# Patient Record
Sex: MALE | Race: WHITE | NOT HISPANIC OR LATINO | Employment: FULL TIME | URBAN - METROPOLITAN AREA
[De-identification: names, ages, dates, MRNs, and addresses within clinical notes are randomized per-mention and may not be internally consistent; named-entity substitution may affect disease eponyms.]

---

## 2018-10-22 ENCOUNTER — OFFICE VISIT CONVERTED (OUTPATIENT)
Dept: SURGERY | Facility: CLINIC | Age: 38
End: 2018-10-22
Attending: SURGERY

## 2018-11-27 ENCOUNTER — OFFICE VISIT CONVERTED (OUTPATIENT)
Dept: SURGERY | Facility: CLINIC | Age: 38
End: 2018-11-27
Attending: SURGERY

## 2018-12-11 ENCOUNTER — OFFICE VISIT CONVERTED (OUTPATIENT)
Dept: SURGERY | Facility: CLINIC | Age: 38
End: 2018-12-11
Attending: SURGERY

## 2021-05-16 VITALS — BODY MASS INDEX: 37.1 KG/M2 | HEIGHT: 71 IN | WEIGHT: 265 LBS | RESPIRATION RATE: 16 BRPM

## 2021-05-16 VITALS — RESPIRATION RATE: 16 BRPM | WEIGHT: 264 LBS | HEIGHT: 71 IN | BODY MASS INDEX: 36.96 KG/M2

## 2021-05-16 VITALS — HEIGHT: 71 IN | BODY MASS INDEX: 36.96 KG/M2 | WEIGHT: 264 LBS | RESPIRATION RATE: 14 BRPM

## 2023-05-02 ENCOUNTER — OFFICE VISIT (OUTPATIENT)
Dept: ORTHOPEDIC SURGERY | Facility: CLINIC | Age: 43
End: 2023-05-02
Payer: COMMERCIAL

## 2023-05-02 VITALS — HEIGHT: 71 IN | WEIGHT: 285 LBS | BODY MASS INDEX: 39.9 KG/M2

## 2023-05-02 DIAGNOSIS — M25.561 RIGHT KNEE PAIN, UNSPECIFIED CHRONICITY: Primary | ICD-10-CM

## 2023-05-02 DIAGNOSIS — S83.241A ACUTE MEDIAL MENISCUS TEAR OF RIGHT KNEE, INITIAL ENCOUNTER: ICD-10-CM

## 2023-05-02 NOTE — PROGRESS NOTES
"Chief Complaint  Initial Evaluation of the Right Knee     Subjective      Sung Vasquez presents to [facility] for initial evaluation of the right knee.  He has had knee injuries off and on for years. His pain started beginning of January.  Pain with side to side and twisting is painful. He has difficulty with transition from sit to stand.  Walking doesn't hurt but uneven ground and stairs are difficulty.  He has braced and used anti inflammatories in the past.      No Known Allergies     Social History     Socioeconomic History   • Marital status:    Tobacco Use   • Smoking status: Never     Passive exposure: Never   • Smokeless tobacco: Never   Vaping Use   • Vaping Use: Never used   Substance and Sexual Activity   • Alcohol use: Yes     Comment: rarely        Review of Systems     Objective   Vital Signs:   Ht 180.3 cm (71\")   Wt 129 kg (285 lb)   BMI 39.75 kg/m²       Physical Exam  Constitutional:       Appearance: Normal appearance. Patient is well-developed and normal weight.   HENT:      Head: Normocephalic.      Right Ear: Hearing and external ear normal.      Left Ear: Hearing and external ear normal.      Nose: Nose normal.   Eyes:      Conjunctiva/sclera: Conjunctivae normal.   Cardiovascular:      Rate and Rhythm: Normal rate.   Pulmonary:      Effort: Pulmonary effort is normal.      Breath sounds: No wheezing or rales.   Abdominal:      Palpations: Abdomen is soft.      Tenderness: There is no abdominal tenderness.   Musculoskeletal:      Cervical back: Normal range of motion.   Skin:     Findings: No rash.   Neurological:      Mental Status: Patient is alert and oriented to person, place, and time.   Psychiatric:         Mood and Affect: Mood and affect normal.         Judgment: Judgment normal.       Ortho Exam      RIGHT KNEE Flexion 120. Extension 0. Stable to varus/valgus stress. Stable to anterior/posterior drawer. Neurovascularly intact. Positive " Filemon. Negative Lachman. Positive EHL, FHL, HS and TA. Sensation intact to light touch all 5 nerves of the foot. Ambulates with Non-antalgic gait. Patella is well tracking. Calf supple, non-tender. Positive tenderness to the medial joint line. Positive tenderness to the lateral joint line. Positive Crepitus. Good strength to hamstrings, quadriceps, dorsiflexors, and plantar flexors.  Knee Extensor Mechanism intact        Procedures      Imaging Results (Most Recent)     Procedure Component Value Units Date/Time    XR Knee 3 View Right [[id number]] Resulted: 05/02/23 1525     Updated: 05/02/23 1528           Result Review :     X-Ray Report:  Right knee X-Ray  Indication: Evaluation of the right knee  AP/Lateral and [place] view(s)  Findings: Minimal arthritis.  No fracture or dislocation.    Prior studies available for comparison: Yes            Assessment and Plan     Diagnoses and all orders for this visit:    1. Right knee pain, unspecified chronicity (Primary)  -     XR Knee 3 View Right    2. Acute medial meniscus tear of right knee, initial encounter      Discussed the treatment plan with the patient. I reviewed the X-rays that were obtained today with the patient. MRI to assess possible tears.     Prescribed anti inflammatory.     Call or return if worsening symptoms.    Follow Up     After MRI.       Patient was given instructions and counseling regarding his condition or for health maintenance advice. Please see specific information pulled into the AVS if appropriate.     Scribed for Philip Dee MD by Linnea Torres MA.  05/02/23   15:48 EDT    I have personally performed the services described in this document as scribed by the above individual and it is both accurate and complete. Philip Dee MD 05/04/23

## 2023-05-04 RX ORDER — DICLOFENAC SODIUM 75 MG/1
75 TABLET, DELAYED RELEASE ORAL 2 TIMES DAILY
Qty: 60 TABLET | Refills: 0 | Status: SHIPPED | OUTPATIENT
Start: 2023-05-04

## 2023-05-18 ENCOUNTER — HOSPITAL ENCOUNTER (OUTPATIENT)
Dept: MRI IMAGING | Facility: HOSPITAL | Age: 43
Discharge: HOME | End: 2023-05-18
Payer: COMMERCIAL

## 2023-05-18 DIAGNOSIS — S83.241A ACUTE MEDIAL MENISCUS TEAR OF RIGHT KNEE, INITIAL ENCOUNTER: ICD-10-CM

## 2023-05-18 DIAGNOSIS — M25.561 RIGHT KNEE PAIN, UNSPECIFIED CHRONICITY: ICD-10-CM

## 2023-05-18 PROCEDURE — 73721 MRI JNT OF LWR EXTRE W/O DYE: CPT

## 2023-05-30 ENCOUNTER — OFFICE VISIT (OUTPATIENT)
Dept: ORTHOPEDIC SURGERY | Facility: CLINIC | Age: 43
End: 2023-05-30

## 2023-05-30 VITALS — WEIGHT: 285 LBS | OXYGEN SATURATION: 97 % | BODY MASS INDEX: 39.9 KG/M2 | HEIGHT: 71 IN

## 2023-05-30 DIAGNOSIS — S83.241D ACUTE MEDIAL MENISCUS TEAR OF RIGHT KNEE, SUBSEQUENT ENCOUNTER: Primary | ICD-10-CM

## 2023-05-30 NOTE — PROGRESS NOTES
"Chief Complaint  Pain and Follow-up of the Right Knee     Rey Vasquez presents to [facility] for follow up of the right knee.  He is here today for MRI results. He is trying a different brace, but is still having pain on the medial aspect of his knee.  Side to side movements are still painful. In 2015 had a meniscus repair.  Walking doesn't hurt but uneven ground and stairs are difficulty.     No Known Allergies     Social History     Socioeconomic History   • Marital status:    Tobacco Use   • Smoking status: Never     Passive exposure: Never   • Smokeless tobacco: Never   Vaping Use   • Vaping Use: Never used   Substance and Sexual Activity   • Alcohol use: Yes     Comment: rarely        Review of Systems     Objective   Vital Signs:   Ht 180.3 cm (71\")   Wt 129 kg (285 lb)   SpO2 97%   BMI 39.75 kg/m²       Physical Exam  Constitutional:       Appearance: Normal appearance. Patient is well-developed and normal weight.   HENT:      Head: Normocephalic.      Right Ear: Hearing and external ear normal.      Left Ear: Hearing and external ear normal.      Nose: Nose normal.   Eyes:      Conjunctiva/sclera: Conjunctivae normal.   Cardiovascular:      Rate and Rhythm: Normal rate.   Pulmonary:      Effort: Pulmonary effort is normal.      Breath sounds: No wheezing or rales.   Abdominal:      Palpations: Abdomen is soft.      Tenderness: There is no abdominal tenderness.   Musculoskeletal:      Cervical back: Normal range of motion.   Skin:     Findings: No rash.   Neurological:      Mental Status: Patient is alert and oriented to person, place, and time.   Psychiatric:         Mood and Affect: Mood and affect normal.         Judgment: Judgment normal.       Ortho Exam      RIGHT KNEE Flexion 120. Extension 0. Stable to varus/valgus stress. Stable to anterior/posterior drawer. Neurovascularly intact. Positive Filemon. Negative Lachman. Positive EHL, FHL, " HS and TA. Sensation intact to light touch all 5 nerves of the foot. Ambulates with Non-antalgic gait. Patella is well tracking. Calf supple, non-tender. Positive tenderness to the medial joint line. Positive tenderness to the lateral joint line. Positive Crepitus. Good strength to hamstrings, quadriceps, dorsiflexors, and plantar flexors.  Knee Extensor Mechanism intact      Procedures      Imaging Results (Most Recent)     None           Result Review :         XR Knee 3 View Right    Result Date: 5/4/2023  Narrative: X-Ray Report: Right knee X-Ray Indication: Evaluation of the right knee AP/Lateral and [place] view(s) Findings: Minimal arthritis.  No fracture or dislocation.  Prior studies available for comparison: Yes    MRI Knee Right Without Contrast    Result Date: 5/19/2023  Narrative: RIGHT KNEE MRI  HISTORY: Right knee pain; suspected meniscus tear. No trauma.  TECHNIQUE: Right knee MRI was performed in standard fashion.  FINDINGS: Marrow signal is normal. There is a complex medial meniscus tear involving the body and the posterior horn. Extensive abnormal signal extends along the inferior articular surface of the posterior horn and into the red-red zone. There also appears to be a small radial defect along the free edge. There are marginal osteophytes around the medial compartment. However, the articular cartilage is intact.  Old chondromalacia is observed along the midline of the lateral femoral condyle weightbearing surface. Cartilage elsewhere in the lateral compartment is preserved and the lateral meniscus is intact.  There is old chondromalacia at the patellofemoral compartment. A small joint effusion is present and there is chondral debris in the medial gastrocnemius/semimembranous is bursa.  The anterior and posterior cruciate ligaments, medial and lateral collateral ligaments, quadriceps mechanism, and the other muscles and tendons of the knee are intact.      Impression: Complex right medial  meniscus tear. Some chondromalacia is observed involving all three compartments of the knee, most prominently the medial compartment. Chondral debris is observed within the medial gastrocnemius/semimembranosus bursa.  This report was finalized on 5/19/2023 6:57 AM by Dr. Sebastian Escobar M.D.               Assessment and Plan     Diagnoses and all orders for this visit:    1. Acute medial meniscus tear of right knee, subsequent encounter (Primary)        Discussed the treatment plan with the patient. I reviewed the MRI results with the patient.     Discussed the treatment options with the patient, operative vs non-operative.     The patient expressed understanding and wished to proceed with conservative measures.     Discussed use of anti inflammatories and injections as needed.     Call or return if worsening symptoms.    Follow Up     PRN      Patient was given instructions and counseling regarding his condition or for health maintenance advice. Please see specific information pulled into the AVS if appropriate.     Scribed for Philip Dee MD by Linnea Torres MA.  05/30/23   08:37 EDT    I have personally performed the services described in this document as scribed by the above individual and it is both accurate and complete. Philip Dee MD 05/30/23